# Patient Record
Sex: FEMALE | Race: WHITE | HISPANIC OR LATINO | Employment: UNEMPLOYED | URBAN - METROPOLITAN AREA
[De-identification: names, ages, dates, MRNs, and addresses within clinical notes are randomized per-mention and may not be internally consistent; named-entity substitution may affect disease eponyms.]

---

## 2022-07-13 ENCOUNTER — TRANSFERRED RECORDS (OUTPATIENT)
Dept: MULTI SPECIALTY CLINIC | Facility: CLINIC | Age: 32
End: 2022-07-13

## 2022-07-13 LAB
HPV ABSTRACT: NORMAL
PAP-ABSTRACT: NORMAL

## 2022-10-11 ENCOUNTER — OFFICE VISIT (OUTPATIENT)
Dept: FAMILY MEDICINE | Facility: CLINIC | Age: 32
End: 2022-10-11
Payer: COMMERCIAL

## 2022-10-11 VITALS
HEIGHT: 62 IN | OXYGEN SATURATION: 99 % | BODY MASS INDEX: 32.13 KG/M2 | HEART RATE: 91 BPM | TEMPERATURE: 98.5 F | RESPIRATION RATE: 18 BRPM | DIASTOLIC BLOOD PRESSURE: 77 MMHG | WEIGHT: 174.6 LBS | SYSTOLIC BLOOD PRESSURE: 117 MMHG

## 2022-10-11 DIAGNOSIS — E66.811 CLASS 1 OBESITY DUE TO EXCESS CALORIES WITHOUT SERIOUS COMORBIDITY WITH BODY MASS INDEX (BMI) OF 31.0 TO 31.9 IN ADULT: ICD-10-CM

## 2022-10-11 DIAGNOSIS — Z78.9 NON-SMOKER: ICD-10-CM

## 2022-10-11 DIAGNOSIS — E66.09 CLASS 1 OBESITY DUE TO EXCESS CALORIES WITHOUT SERIOUS COMORBIDITY WITH BODY MASS INDEX (BMI) OF 31.0 TO 31.9 IN ADULT: ICD-10-CM

## 2022-10-11 DIAGNOSIS — E03.8 OTHER SPECIFIED HYPOTHYROIDISM: ICD-10-CM

## 2022-10-11 DIAGNOSIS — Z00.00 ROUTINE GENERAL MEDICAL EXAMINATION AT A HEALTH CARE FACILITY: Primary | ICD-10-CM

## 2022-10-11 PROCEDURE — 99213 OFFICE O/P EST LOW 20 MIN: CPT | Mod: 25 | Performed by: STUDENT IN AN ORGANIZED HEALTH CARE EDUCATION/TRAINING PROGRAM

## 2022-10-11 PROCEDURE — 99385 PREV VISIT NEW AGE 18-39: CPT | Performed by: STUDENT IN AN ORGANIZED HEALTH CARE EDUCATION/TRAINING PROGRAM

## 2022-10-11 PROCEDURE — 84443 ASSAY THYROID STIM HORMONE: CPT | Performed by: STUDENT IN AN ORGANIZED HEALTH CARE EDUCATION/TRAINING PROGRAM

## 2022-10-11 PROCEDURE — 36415 COLL VENOUS BLD VENIPUNCTURE: CPT | Performed by: STUDENT IN AN ORGANIZED HEALTH CARE EDUCATION/TRAINING PROGRAM

## 2022-10-11 RX ORDER — LEVOTHYROXINE SODIUM 25 UG/1
25 TABLET ORAL DAILY
COMMUNITY
End: 2023-04-28

## 2022-10-11 ASSESSMENT — ENCOUNTER SYMPTOMS
SORE THROAT: 1
CHILLS: 0
DIARRHEA: 0
EYE PAIN: 1
NAUSEA: 1
FREQUENCY: 0
HEADACHES: 1
SHORTNESS OF BREATH: 1
ARTHRALGIAS: 1
HEMATURIA: 0
MYALGIAS: 1
HEARTBURN: 1
PALPITATIONS: 1
ABDOMINAL PAIN: 1
DYSURIA: 0
COUGH: 0
CONSTIPATION: 0
DIZZINESS: 1
NERVOUS/ANXIOUS: 1
JOINT SWELLING: 0
FEVER: 0
PARESTHESIAS: 1
BREAST MASS: 1
WEAKNESS: 1
HEMATOCHEZIA: 0

## 2022-10-11 ASSESSMENT — PAIN SCALES - GENERAL: PAINLEVEL: MODERATE PAIN (5)

## 2022-10-11 NOTE — PROGRESS NOTES
SUBJECTIVE:   CC: Judy is an 32 year old who presents for preventive health visit.       Patient has been advised of split billing requirements and indicates understanding: Yes  Healthy Habits:     Getting at least 3 servings of Calcium per day:  Yes    Bi-annual eye exam:  NO    Dental care twice a year:  NO    Sleep apnea or symptoms of sleep apnea:  None    Diet:  Regular (no restrictions)    Frequency of exercise:  2-3 days/week    Duration of exercise:  N/A    Taking medications regularly:  Yes    Medication side effects:  None    PHQ-2 Total Score: 0    Additional concerns today:  Yes          Patient has been taking levothyroxine since when she was in [place].    Today's PHQ-2 Score:   PHQ-2 ( 1999 Pfizer) 10/11/2022   Q1: Little interest or pleasure in doing things 0   Q2: Feeling down, depressed or hopeless 0   PHQ-2 Score 0   Q1: Little interest or pleasure in doing things Not at all   Q2: Feeling down, depressed or hopeless Not at all   PHQ-2 Score 0       Abuse: Current or Past (Physical, Sexual or Emotional) - No  Do you feel safe in your environment? Yes        Social History     Tobacco Use     Smoking status: Never     Smokeless tobacco: Never   Substance Use Topics     Alcohol use: Yes     Comment: Occassionally     If you drink alcohol do you typically have >3 drinks per day or >7 drinks per week? No    Alcohol Use 10/11/2022   Prescreen: >3 drinks/day or >7 drinks/week? No   Prescreen: >3 drinks/day or >7 drinks/week? -       Reviewed orders with patient.  Reviewed health maintenance and updated orders accordingly - Yes  Lab work is in process    Breast Cancer Screening:    Breast CA Risk Assessment (FHS-7) 10/11/2022   Do you have a family history of breast, colon, or ovarian cancer? No / Unknown         History of abnormal Pap smear: NO - age 30-65 PAP every 5 years with negative HPV co-testing recommended.  She had Pap smear done in July of this year that was unremarkable     Reviewed and  "updated as needed this visit by clinical staff   Tobacco  Allergies  Meds  Problems  Med Hx  Surg Hx  Fam Hx          Reviewed and updated as needed this visit by Provider   Tobacco  Allergies  Meds  Problems  Med Hx  Surg Hx  Fam Hx         History reviewed. No pertinent past medical history.   History reviewed. No pertinent surgical history.  OB History   No obstetric history on file.         Review of Systems   Constitutional: Negative for chills and fever.   HENT: negative for ear pain, hearing loss and sore throat. Negative for congestion.    Eyes: negative for pain and visual disturbance.   Respiratory: negative for shortness of breath. Negative for cough.    Cardiovascular: negative for chest pain and palpitations. Negative for peripheral edema.   Gastrointestinal: negative for abdominal pain, heartburn and nausea. Negative for constipation, diarrhea and hematochezia.   Breasts:  negative for breast mass. Negative for tenderness and discharge.   Genitourinary: Negative for dysuria, frequency, genital sores, hematuria, pelvic pain, urgency, vaginal bleeding and vaginal discharge.   Musculoskeletal: negative for arthralgias and myalgias. Negative for joint swelling.   Skin: Negative for rash.   Neurological: negative for dizziness, weakness, headaches and paresthesias.   Psychiatric/Behavioral: negative for mood changes. The patient is nervous/anxious.         OBJECTIVE:   /77   Pulse 91   Temp 98.5  F (36.9  C) (Oral)   Resp 18   Ht 1.575 m (5' 2\")   Wt 79.2 kg (174 lb 9.6 oz)   LMP 09/15/2022 (Approximate)   SpO2 99%   BMI 31.93 kg/m    Physical Exam  GENERAL: healthy, alert and no distress  EYES: Eyes grossly normal to inspection, PERRL and conjunctivae and sclerae normal  HENT: ear canals and TM's normal, nose and mouth without ulcers or lesions  NECK: no adenopathy, no asymmetry, masses, or scars and thyroid normal to palpation  RESP: lungs clear to auscultation - no rales, " "rhonchi or wheezes  CV: regular rate and rhythm, normal S1 S2, no S3 or S4, no murmur, click or rub, no peripheral edema and peripheral pulses strong  ABDOMEN: soft, nontender, no hepatosplenomegaly, no masses and bowel sounds normal  MS: no gross musculoskeletal defects noted, no edema  SKIN: no suspicious lesions or rashes  NEURO: Normal strength and tone, mentation intact and speech normal  PSYCH: mentation appears normal, affect normal/bright      ASSESSMENT/PLAN:   1. Routine general medical examination at a health care facility  Everywhere results reviewed patient was negative for hepatitis B, hepatitis C and HIV in March 2022  - Adult Eye  Referral; Future  - Dental Referral; Future    2. Other specified hypothyroidism  Continue levothyroxine.  She is on levothyroxine 25 mg daily from Monday to Friday  - TSH with free T4 reflex; Future  - TSH with free T4 reflex    3. Non-smoker      4. Class 1 obesity due to excess calories without serious comorbidity with body mass index (BMI) of 31.0 to 31.9 in adult    Counseled on diet and exercise    Patient has been advised of split billing requirements and indicates understanding: Yes    COUNSELING:  Reviewed preventive health counseling, as reflected in patient instructions    Estimated body mass index is 31.93 kg/m  as calculated from the following:    Height as of this encounter: 1.575 m (5' 2\").    Weight as of this encounter: 79.2 kg (174 lb 9.6 oz).    Weight management plan: Discussed healthy diet and exercise guidelines    She reports that she has never smoked. She has never used smokeless tobacco.      Counseling Resources:  ATP IV Guidelines  Pooled Cohorts Equation Calculator  Breast Cancer Risk Calculator  BRCA-Related Cancer Risk Assessment: FHS-7 Tool  FRAX Risk Assessment  ICSI Preventive Guidelines  Dietary Guidelines for Americans, 2010  [employer]'s MyPlate  ASA Prophylaxis  Lung CA Screening    Rachel Davis MD  [facility] " [employer] [place]

## 2022-10-11 NOTE — LETTER
October 13, 2022      Judy Kelly  [address]  [place] [address]        Dear MsMayito,    We are writing to inform you of your test results.    Your result are normal.    Resulted Orders   TSH with free T4 reflex   Result Value Ref Range    TSH 1.90 0.40 - 4.00 mU/L       If you have any questions or concerns, please call the clinic at the number listed above.       Sincerely,    Rachel Davis MD,MPH    hlo

## 2022-10-12 LAB — TSH SERPL DL<=0.005 MIU/L-ACNC: 1.9 MU/L (ref 0.4–4)

## 2022-11-22 ENCOUNTER — MEDICAL CORRESPONDENCE (OUTPATIENT)
Dept: HEALTH INFORMATION MANAGEMENT | Facility: CLINIC | Age: 32
End: 2022-11-22

## 2022-11-23 ENCOUNTER — OFFICE VISIT (OUTPATIENT)
Dept: OPHTHALMOLOGY | Facility: CLINIC | Age: 32
End: 2022-11-23
Payer: COMMERCIAL

## 2022-11-23 DIAGNOSIS — Z01.00 EXAMINATION OF EYES AND VISION: Primary | ICD-10-CM

## 2022-11-23 DIAGNOSIS — H52.13 MYOPIA OF BOTH EYES: ICD-10-CM

## 2022-11-23 PROBLEM — O92.70 LACTATION DISORDER: Status: ACTIVE | Noted: 2022-06-03

## 2022-11-23 PROCEDURE — 92004 COMPRE OPH EXAM NEW PT 1/>: CPT | Performed by: OPHTHALMOLOGY

## 2022-11-23 PROCEDURE — 92015 DETERMINE REFRACTIVE STATE: CPT | Performed by: OPHTHALMOLOGY

## 2022-11-23 ASSESSMENT — REFRACTION_MANIFEST
OS_CYLINDER: +0.25
OS_SPHERE: -0.50
OD_SPHERE: -0.50
OS_AXIS: 010
OD_AXIS: 170
OD_CYLINDER: +0.50

## 2022-11-23 ASSESSMENT — VISUAL ACUITY
OS_SC: 20/20
OD_SC+: -2
OS_SC+: -1
METHOD: SNELLEN - LINEAR
OD_SC: 20/20

## 2022-11-23 ASSESSMENT — EXTERNAL EXAM - RIGHT EYE: OD_EXAM: NORMAL

## 2022-11-23 ASSESSMENT — CONF VISUAL FIELD
OD_SUPERIOR_NASAL_RESTRICTION: 0
OS_INFERIOR_TEMPORAL_RESTRICTION: 0
OS_INFERIOR_NASAL_RESTRICTION: 0
OS_NORMAL: 1
OD_INFERIOR_TEMPORAL_RESTRICTION: 0
OS_SUPERIOR_NASAL_RESTRICTION: 0
OS_SUPERIOR_TEMPORAL_RESTRICTION: 0
OD_NORMAL: 1
OD_INFERIOR_NASAL_RESTRICTION: 0
OD_SUPERIOR_TEMPORAL_RESTRICTION: 0

## 2022-11-23 ASSESSMENT — CUP TO DISC RATIO
OS_RATIO: 0.5
OD_RATIO: 0.5

## 2022-11-23 ASSESSMENT — TONOMETRY
IOP_METHOD: APPLANATION
OD_IOP_MMHG: 16
OS_IOP_MMHG: 16

## 2022-11-23 ASSESSMENT — SLIT LAMP EXAM - LIDS
COMMENTS: NORMAL
COMMENTS: NORMAL

## 2022-11-23 ASSESSMENT — EXTERNAL EXAM - LEFT EYE: OS_EXAM: NORMAL

## 2022-11-23 NOTE — PATIENT INSTRUCTIONS
"Glasses Rx given - optional (do not need)  May use artificial tears up to four times a day (like Refresh Optive, Systane Balance, TheraTears, or generic artificial tears are ok. Avoid \"get the red out\" drops).   If concerned about eye redness try using Lumify.  Call in July 2023/ 2024 for an appointment in November 2023/ 2024 for Complete Exam    Dr. Scott [phone]        Gafas Rx dadas - opcional (no es necesario)  Puede usar lágrimas artificiales hasta cuatro veces al día (alec Refresh Optive, Systane Balance, TheraTears, o lágrimas artificiales genéricas están walt. Evite las gotas de \"sacar el disla\").   Si le preocupa el enrojecimiento de los ojos, intente usar Lumify.  Llame en julio de 2023 / 2024 para medhat charles en noviembre de 2023 / 2024 para el examen completo    Dr. Scott [phone]  "

## 2022-11-23 NOTE — LETTER
"    11/23/2022         RE: Judy Kelly  [address]  [place] [address]        Dear Colleague,    Thank you for referring your patient, Judy Kelly, to the [facility]. Please see a copy of my visit note below.     Current Eye Medications:  Visine red out BID Both eyes. Was told to use these drops about three years ago.     Subjective:  Here for complete eye exam. Sometimes blurred in the distance. Does not have any glasses, wasn't given an Rx when she had her last eye exam.  She had eye exam about three years ago.     Objective:  See Ophthalmology Exam.       Assessment:  Baseline eye exam in patient with mild myopia.      ICD-10-CM    1. Examination of eyes and vision  Z01.00       2. Myopia of both eyes  H52.13            Plan:  Glasses Rx given - optional (do not need)  May use artificial tears up to four times a day (like Refresh Optive, Systane Balance, TheraTears, or generic artificial tears are ok. Avoid \"get the red out\" drops).   If concerned about eye redness try using Lumify.  Call in July 2023/ 2024 for an appointment in November 2023/ 2024 for Complete Exam    Dr. Scott [phone]           Again, thank you for allowing me to participate in the care of your patient.        Sincerely,        Marshall Scott MD    "

## 2022-11-23 NOTE — PROGRESS NOTES
" Current Eye Medications:  Visine red out BID Both eyes. Was told to use these drops about three years ago.     Subjective:  Here for complete eye exam. Sometimes blurred in the distance. Does not have any glasses, wasn't given an Rx when she had her last eye exam.  She had eye exam about three years ago.     Objective:  See Ophthalmology Exam.       Assessment:  Baseline eye exam in patient with mild myopia.      ICD-10-CM    1. Examination of eyes and vision  Z01.00       2. Myopia of both eyes  H52.13            Plan:  Glasses Rx given - optional (do not need)  May use artificial tears up to four times a day (like Refresh Optive, Systane Balance, TheraTears, or generic artificial tears are ok. Avoid \"get the red out\" drops).   If concerned about eye redness try using Lumify.  Call in July 2023/ 2024 for an appointment in November 2023/ 2024 for Complete Exam    Dr. Scott [phone]       "

## 2022-12-06 ENCOUNTER — TELEPHONE (OUTPATIENT)
Dept: OPHTHALMOLOGY | Facility: CLINIC | Age: 32
End: 2022-12-06

## 2022-12-06 DIAGNOSIS — H57.89 REDNESS OR DISCHARGE OF EYE: Primary | ICD-10-CM

## 2022-12-06 NOTE — TELEPHONE ENCOUNTER
[facility] Call Center    Phone Message    May a detailed message be left on voicemail: yes     Reason for Call: Other: Pt called about her her order for Tetrahydrozoline HCl (VISINE RED EYE COMFORT OP). She would like to know what pharmacy it was sent to. Writer is not finding that information on the Rx list. Please contact pt to discuss. She would like the order to be sent to [facility] [place], [place] - [address] [facility].     Thank you     Action Taken: Message routed to:  [facility] eye    Travel Screening: Not Applicable

## 2023-03-23 ENCOUNTER — OFFICE VISIT (OUTPATIENT)
Dept: FAMILY MEDICINE | Facility: CLINIC | Age: 33
End: 2023-03-23
Payer: COMMERCIAL

## 2023-03-23 VITALS
TEMPERATURE: 97.2 F | HEART RATE: 79 BPM | WEIGHT: 169 LBS | HEIGHT: 62 IN | OXYGEN SATURATION: 100 % | RESPIRATION RATE: 14 BRPM | DIASTOLIC BLOOD PRESSURE: 78 MMHG | BODY MASS INDEX: 31.1 KG/M2 | SYSTOLIC BLOOD PRESSURE: 110 MMHG

## 2023-03-23 DIAGNOSIS — Z30.09 ENCOUNTER FOR OTHER GENERAL COUNSELING OR ADVICE ON CONTRACEPTION: ICD-10-CM

## 2023-03-23 DIAGNOSIS — L80 VITILIGO: ICD-10-CM

## 2023-03-23 DIAGNOSIS — Z71.89 ADVANCED CARE PLANNING/COUNSELING DISCUSSION: ICD-10-CM

## 2023-03-23 DIAGNOSIS — E03.8 OTHER SPECIFIED HYPOTHYROIDISM: Primary | ICD-10-CM

## 2023-03-23 DIAGNOSIS — R20.2 PARESTHESIAS: ICD-10-CM

## 2023-03-23 DIAGNOSIS — R63.1 POLYDIPSIA: ICD-10-CM

## 2023-03-23 LAB
HBA1C MFR BLD: 5.5 % (ref 0–5.6)
TSH SERPL DL<=0.005 MIU/L-ACNC: 2.24 MU/L (ref 0.4–4)

## 2023-03-23 PROCEDURE — 83036 HEMOGLOBIN GLYCOSYLATED A1C: CPT | Performed by: PHYSICIAN ASSISTANT

## 2023-03-23 PROCEDURE — 84443 ASSAY THYROID STIM HORMONE: CPT | Performed by: PHYSICIAN ASSISTANT

## 2023-03-23 PROCEDURE — 36415 COLL VENOUS BLD VENIPUNCTURE: CPT | Performed by: PHYSICIAN ASSISTANT

## 2023-03-23 PROCEDURE — 82607 VITAMIN B-12: CPT | Performed by: PHYSICIAN ASSISTANT

## 2023-03-23 PROCEDURE — 99214 OFFICE O/P EST MOD 30 MIN: CPT | Performed by: PHYSICIAN ASSISTANT

## 2023-03-23 RX ORDER — TRIAMCINOLONE ACETONIDE 1 MG/G
CREAM TOPICAL 2 TIMES DAILY
Qty: 80 G | Refills: 1 | Status: SHIPPED | OUTPATIENT
Start: 2023-03-23 | End: 2023-10-20

## 2023-03-23 ASSESSMENT — PAIN SCALES - GENERAL: PAINLEVEL: NO PAIN (0)

## 2023-03-23 NOTE — PROGRESS NOTES
Assessment & Plan   Problem List Items Addressed This Visit    None  Visit Diagnoses     Other specified hypothyroidism    -  Primary    Relevant Orders    TSH with free T4 reflex (Completed)    Advanced care planning/counseling discussion        Paresthesias        Relevant Orders    Hemoglobin A1c (Completed)    Vitamin B12    Polydipsia        Relevant Orders    Hemoglobin A1c (Completed)    Vitiligo        Relevant Medications    triamcinolone (KENALOG) 0.1 % external cream    Other Relevant Orders    Adult Dermatology Referral    Encounter for other general counseling or advice on contraception        Relevant Orders    Ob/Gyn Referral         She has multiple concerns today.  Interested in repeat TSH testing, though her last level was normal.  She has been taking thyroid replacement regularly.  She would like treatment for vitiligo.  We will trial the above cream and have her follow-up with dermatology.  No overlying signs of infection.  A1c reassuring, this lower extremity paresthesias are likely not neuropathic due to DM.  B12 pending.  She is also interested in seeing OB/GYN to discuss sterilization.   is not interested in vasectomy and she is not interested in an IUD.  Referral placed.    Complete history and physical exam as below. Afebrile with normal vital signs.    DDx and Dx discussed with and explained to the pt to their satisfaction.  All questions were answered at this time. Pt expressed understanding of and agreement with this dx, tx, and plan. No further workup warranted and standard medication warnings given. I have given the patient a list of pertinent indications for re-evaluation. Will go to the Emergency Department if symptoms worsen or new concerning symptoms arise. Patient left in no apparent distress.     Ordering of each unique test     See Patient Instructions    DANYEL Brownlee  [facility] [place] Kim is a 32 year old, presenting for the  "following health issues:  Thyroid Problem, Diabetes, and Rash  No flowsheet data found.  History of Present Illness       Diabetes:   She presents for follow up of diabetes.  She is not checking blood glucose. She is concerned about other. She is having numbness in feet, burning in feet, blurry vision and weight gain.         Hypothyroidism:     Since last visit, patient describes the following symptoms::  Fatigue    She eats 4 or more servings of fruits and vegetables daily.She consumes 0 sweetened beverage(s) daily.She exercises with enough effort to increase her heart rate 30 to 60 minutes per day.  She exercises with enough effort to increase her heart rate 4 days per week.   She is taking medications regularly.     Wants blood work to check how her thyroid is doing.  TSH was last normal 5 months ago and she had no adjustment in her levothyroxine dose at that time.  Numbness tingling in feet for 2+ years. Prediabetes diagnosis in [place] 2 years ago.   Light spots on her forehead, both sides of breasts. 6 months now.  Was treated for vitiligo previously with topical creams.    Review of Systems   Constitutional, HEENT, cardiovascular, pulmonary, gi and gu systems are negative, except as otherwise noted.      Objective    /78   Pulse 79   Temp 97.2  F (36.2  C) (Tympanic)   Resp 14   Ht 1.575 m (5' 2.01\")   Wt 76.7 kg (169 lb)   LMP 03/22/2023 (Approximate)   SpO2 100%   Breastfeeding No   BMI 30.90 kg/m    Body mass index is 30.9 kg/m .  Physical Exam  Vitals and nursing note reviewed. Exam conducted with a chaperone present ( could hear audio during exam).   Constitutional:       General: She is not in acute distress.     Appearance: Normal appearance. She is not diaphoretic.   HENT:      Head: Normocephalic and atraumatic.      Nose: Nose normal.   Eyes:      Conjunctiva/sclera: Conjunctivae normal.   Cardiovascular:      Comments: 2+ symmetric DP pulses.  Pulmonary:      " Effort: Pulmonary effort is normal. No respiratory distress.   Skin:     General: Skin is dry.      Coloration: Skin is not jaundiced or pale.      Comments: 3 patches of hypopigmentation are present.  1 was present on her right forehead along the hairline and the other 2 are present on the left breast.  No overlying signs of trauma or infection.  No discharge from the nipple or dimpling of the breast.   Neurological:      General: No focal deficit present.      Mental Status: She is alert. Mental status is at baseline.      Comments: Diminished sensation to light touch along the soles of her feet bilaterally.  No overlying signs of trauma or infection.   Psychiatric:         Mood and Affect: Mood normal.         Behavior: Behavior normal.          Results for orders placed or performed in visit on 03/23/23   TSH with free T4 reflex     Status: Normal   Result Value Ref Range    TSH 2.24 0.40 - 4.00 mU/L   Hemoglobin A1c     Status: Normal   Result Value Ref Range    Hemoglobin A1C 5.5 0.0 - 5.6 %

## 2023-03-23 NOTE — LETTER
March 26, 2023      Judy Kelly  [address]  [place] [address]        Dear ,    We are writing to inform you of your test results.    Your test results fall within the expected range(s) or remain unchanged from previous results.  Please continue with current treatment plan.    Resulted Orders   TSH with free T4 reflex   Result Value Ref Range    TSH 2.24 0.40 - 4.00 mU/L   Hemoglobin A1c   Result Value Ref Range    Hemoglobin A1C 5.5 0.0 - 5.6 %      Comment:      Normal <5.7%   Prediabetes 5.7-6.4%    Diabetes 6.5% or higher     Note: Adopted from ADA consensus guidelines.   Vitamin B12   Result Value Ref Range    Vitamin B12 971 232 - 1,245 pg/mL       If you have any questions or concerns, please call the clinic at the number listed above.       Sincerely,      DANYEL Brownlee

## 2023-03-23 NOTE — PATIENT INSTRUCTIONS
Sunday Carrillo  Si necesita un intérprete de español, por favor conchis al [phone].    If you need a , please contact us at [phone].    Thank you for allowing [facility] to manage your care.    If you develop worsening/changing symptoms at any time, please call 911 or go to the emergency department for evaluation.    I ordered some lab work, please go to the laboratory to get your studies.    I sent your prescriptions to your pharmacy.    I made referral to dermatology and they will be calling in approximately 1 week to set up your appointment.  If you do not hear from them, please call the specialty number on your after visit summary.     Call the [facility] to schedule an appointment [facility] to discuss a tubal ligation to prevent further pregnancies.    Please allow 1-2 business days for our office to contact you in regards to your laboratory/radiological studies.  If not done so, I encourage you to login into [email or web address] to review your results as well.     If you have any questions or concerns, please feel free to call us at [phone]    Sincerely,    Burke Moss PA-C    Did you know?      You can schedule a video visit for follow-up appointments as well as future appointments for certain conditions.  Please see the below link.     [email or web address]    If you have not already done so,  I encourage you to sign up for [email or web address] ([email or web address]).  This will allow you to review your results, securely communicate with a provider, and schedule virtual visits as well.

## 2023-03-24 LAB — VIT B12 SERPL-MCNC: 971 PG/ML (ref 232–1245)

## 2023-04-28 ENCOUNTER — OFFICE VISIT (OUTPATIENT)
Dept: OBGYN | Facility: CLINIC | Age: 33
End: 2023-04-28
Payer: COMMERCIAL

## 2023-04-28 VITALS
WEIGHT: 171.4 LBS | DIASTOLIC BLOOD PRESSURE: 75 MMHG | BODY MASS INDEX: 31.34 KG/M2 | HEART RATE: 91 BPM | OXYGEN SATURATION: 97 % | SYSTOLIC BLOOD PRESSURE: 104 MMHG

## 2023-04-28 DIAGNOSIS — Z30.09 STERILIZATION CONSULT: Primary | ICD-10-CM

## 2023-04-28 DIAGNOSIS — E03.8 OTHER SPECIFIED HYPOTHYROIDISM: Primary | ICD-10-CM

## 2023-04-28 PROCEDURE — 99204 OFFICE O/P NEW MOD 45 MIN: CPT | Performed by: OBSTETRICS & GYNECOLOGY

## 2023-04-28 RX ORDER — LEVOTHYROXINE SODIUM 25 UG/1
25 TABLET ORAL DAILY
Qty: 90 TABLET | Refills: 3 | Status: SHIPPED | OUTPATIENT
Start: 2023-04-28 | End: 2024-02-16

## 2023-04-28 NOTE — PROGRESS NOTES
Judy is a 32 year old  referred here by Self for consultation regarding sterilization..  Patient is here with her daughter who is 20 years old and   by iPad.  She is here to inquire about permanent sterilization.  She presently has the ParaGard IUD placed about a year ago after her last delivery by .  She says she does not have any complaints about the ParaGard IUD verified from care everywhere.  She says she is not sure about how long the ParaGard IUD last.  I looked that up for her and verify that is the ParaGard IUD placed on 7/15/2022 which would last for 10 years.      ROS: Ten point review of systems was reviewed and negative except the above.  OB History    Para Term  AB Living   3 3 2 1 0 3   SAB IAB Ectopic Multiple Live Births   0 0 0 0 3      # Outcome Date GA Lbr Joseph/2nd Weight Sex Delivery Anes PTL Lv   3  22    M CS-Unspec   PETE   2 Term 14    M    PETE   1 Term 10/11/07    F    PETE       Gyne: - abn pap (last pap ), - STD's    History reviewed. No pertinent past medical history.  History reviewed. No pertinent surgical history.  Patient Active Problem List   Diagnosis     Lactation disorder       ALL/Meds: Her medication and allergy histories were reviewed and are documented in their appropriate chart areas.    SH: - tob, - EtOH,     FH: Her family history was reviewed and documented in its appropriate chart area.    PE: /75 (BP Location: Left arm, Patient Position: Sitting, Cuff Size: Adult Large)   Pulse 91   Wt 77.7 kg (171 lb 6.4 oz)   LMP 2023 (Approximate)   SpO2 97%   BMI 31.34 kg/m    Body mass index is 31.34 kg/m .    General Appearance:  healthy, alert, active, no distress  HEENT: NCAT    A/P    ICD-10-CM    1. Sterilization consult  Z30.09         Reviewed different methods of sterilization including vasectomy, Essure and LSC TL. Reviewed pros/cons of different methods of sterilization. Reviewed  that compared to female sterilization, male sterilization is an in office procedure and less surgically risky due to the external placement of the male reproductive organs. Reviewed that LSC TL requires general anesthesia, 2 small incisions, and is effective immediately without need for confirmatory testing.    With the aid of the ACOG   pamphlet on sterilization for both men and females, we discussed the risk benefits and alternatives to sterilization specifically vasectomy for his spouse.  I stressed that vasectomy was safer than female sterilization.  She has agreed to discuss these options with her  and come back as needed..  I also stressed that the ParaGard IUD and the sterilization of both 99% effective, therefore there is no need for any extra contraception.  However if her bleeding becomes a problem she can consider the Mirena IUD which is good for 8 years.    Total time preparing to see patient with reviewing prior encounter and labs, face to face time,  and coordinating care on the same calendar date: 45 mins        CEPHAS AGBEH, MD.

## 2023-04-28 NOTE — PATIENT INSTRUCTIONS
To Schedule an Appointment 24/7  Call: [phone]    If you have any questions regarding your visit, Please contact your care team.  [facility]: [phone]  [facility] HOURS TELEPHONE NUMBER   Cephas Agbeh, M.D.      Keith Rodriguez-Surgery Scheduler  [facility]-Surgery Scheduler         Monday - [place]:    8:00 am-4:45 pm  Tuesday - [place]:   8:00 am-4:45 pm  Friday-[place]:       8:00 am-4:45 pm  Typical Surgery Day:  Wednesday [facility]   [address]   [place] [address] [phone]   Fax [phone]    Imaging Scheduling all locations  [phone]      [facility] Labor and Delivery   [address] [place] [address] [address].   [place], [place] [address] [phone]    [facility]  [address] [facility] [address]  [phone]  Fax [phone]     Urgent Care locations:  [facility] Monday-Friday                               10 am - 8 pm  Saturday and Sunday                      9 am - 5 pm  Monday-Friday                              10 am- 8 pm  Saturday and Sunday                      9 am - 5 pm    [phone]   **Surgeries** Our Surgery Schedulers will contact you to schedule. If you do not receive a call within 3 business days, please call [phone].  If you need a medication refill, please contact your pharmacy. Please allow 3 business days for your refill to be completed.  As always, Thank you for trusting us with your healthcare needs!  see additional instructions from your care team below

## 2023-09-11 ENCOUNTER — PATIENT OUTREACH (OUTPATIENT)
Dept: CARE COORDINATION | Facility: CLINIC | Age: 33
End: 2023-09-11
Payer: COMMERCIAL

## 2023-09-25 ENCOUNTER — PATIENT OUTREACH (OUTPATIENT)
Dept: CARE COORDINATION | Facility: CLINIC | Age: 33
End: 2023-09-25
Payer: COMMERCIAL

## 2023-10-20 ENCOUNTER — OFFICE VISIT (OUTPATIENT)
Dept: FAMILY MEDICINE | Facility: CLINIC | Age: 33
End: 2023-10-20
Payer: COMMERCIAL

## 2023-10-20 VITALS
WEIGHT: 169.6 LBS | BODY MASS INDEX: 31.21 KG/M2 | HEART RATE: 86 BPM | HEIGHT: 62 IN | SYSTOLIC BLOOD PRESSURE: 112 MMHG | RESPIRATION RATE: 20 BRPM | OXYGEN SATURATION: 100 % | TEMPERATURE: 97.2 F | DIASTOLIC BLOOD PRESSURE: 80 MMHG

## 2023-10-20 DIAGNOSIS — Z78.9 NON-ENGLISH SPEAKING PATIENT: ICD-10-CM

## 2023-10-20 DIAGNOSIS — R45.86 MOOD CHANGE: ICD-10-CM

## 2023-10-20 DIAGNOSIS — Z13.1 SCREENING FOR DIABETES MELLITUS: ICD-10-CM

## 2023-10-20 DIAGNOSIS — Z13.220 SCREENING CHOLESTEROL LEVEL: ICD-10-CM

## 2023-10-20 DIAGNOSIS — H53.8 BLURRED VISION: ICD-10-CM

## 2023-10-20 DIAGNOSIS — Z12.31 ENCOUNTER FOR SCREENING MAMMOGRAM FOR BREAST CANCER: ICD-10-CM

## 2023-10-20 DIAGNOSIS — R07.81 RIB PAIN ON RIGHT SIDE: ICD-10-CM

## 2023-10-20 DIAGNOSIS — R10.32 LLQ ABDOMINAL PAIN: ICD-10-CM

## 2023-10-20 DIAGNOSIS — S02.5XXK OPEN FRACTURE OF TOOTH WITH NONUNION, SUBSEQUENT ENCOUNTER: ICD-10-CM

## 2023-10-20 DIAGNOSIS — R10.11 RUQ ABDOMINAL PAIN: ICD-10-CM

## 2023-10-20 DIAGNOSIS — K59.00 CONSTIPATION, UNSPECIFIED CONSTIPATION TYPE: ICD-10-CM

## 2023-10-20 DIAGNOSIS — N92.6 IRREGULAR MENSES: ICD-10-CM

## 2023-10-20 DIAGNOSIS — E03.9 ACQUIRED HYPOTHYROIDISM: ICD-10-CM

## 2023-10-20 DIAGNOSIS — Z00.00 ROUTINE GENERAL MEDICAL EXAMINATION AT A HEALTH CARE FACILITY: ICD-10-CM

## 2023-10-20 DIAGNOSIS — R53.83 FATIGUE, UNSPECIFIED TYPE: Primary | ICD-10-CM

## 2023-10-20 LAB
ALBUMIN SERPL BCG-MCNC: 4.3 G/DL (ref 3.5–5.2)
ALP SERPL-CCNC: 93 U/L (ref 35–104)
ALT SERPL W P-5'-P-CCNC: 14 U/L (ref 0–50)
ANION GAP SERPL CALCULATED.3IONS-SCNC: 11 MMOL/L (ref 7–15)
AST SERPL W P-5'-P-CCNC: 25 U/L (ref 0–45)
BILIRUB SERPL-MCNC: 0.3 MG/DL
BUN SERPL-MCNC: 7.6 MG/DL (ref 6–20)
CALCIUM SERPL-MCNC: 9.1 MG/DL (ref 8.6–10)
CHLORIDE SERPL-SCNC: 104 MMOL/L (ref 98–107)
CHOLEST SERPL-MCNC: 177 MG/DL
CREAT SERPL-MCNC: 0.61 MG/DL (ref 0.51–0.95)
DEPRECATED HCO3 PLAS-SCNC: 24 MMOL/L (ref 22–29)
EGFRCR SERPLBLD CKD-EPI 2021: >90 ML/MIN/1.73M2
GLUCOSE SERPL-MCNC: 87 MG/DL (ref 70–99)
HBA1C MFR BLD: 5.7 % (ref 0–5.6)
HDLC SERPL-MCNC: 55 MG/DL
LDLC SERPL CALC-MCNC: 108 MG/DL
NONHDLC SERPL-MCNC: 122 MG/DL
POTASSIUM SERPL-SCNC: 3.8 MMOL/L (ref 3.4–5.3)
PROT SERPL-MCNC: 7.9 G/DL (ref 6.4–8.3)
SODIUM SERPL-SCNC: 139 MMOL/L (ref 135–145)
TRIGL SERPL-MCNC: 68 MG/DL
TSH SERPL DL<=0.005 MIU/L-ACNC: 1.84 UIU/ML (ref 0.3–4.2)

## 2023-10-20 PROCEDURE — 36415 COLL VENOUS BLD VENIPUNCTURE: CPT | Performed by: NURSE PRACTITIONER

## 2023-10-20 PROCEDURE — 91320 SARSCV2 VAC 30MCG TRS-SUC IM: CPT | Performed by: NURSE PRACTITIONER

## 2023-10-20 PROCEDURE — 99395 PREV VISIT EST AGE 18-39: CPT | Mod: 25 | Performed by: NURSE PRACTITIONER

## 2023-10-20 PROCEDURE — 90480 ADMN SARSCOV2 VAC 1/ONLY CMP: CPT | Performed by: NURSE PRACTITIONER

## 2023-10-20 PROCEDURE — 80053 COMPREHEN METABOLIC PANEL: CPT | Performed by: NURSE PRACTITIONER

## 2023-10-20 PROCEDURE — 90471 IMMUNIZATION ADMIN: CPT | Performed by: NURSE PRACTITIONER

## 2023-10-20 PROCEDURE — 90746 HEPB VACCINE 3 DOSE ADULT IM: CPT | Performed by: NURSE PRACTITIONER

## 2023-10-20 PROCEDURE — 90715 TDAP VACCINE 7 YRS/> IM: CPT | Performed by: NURSE PRACTITIONER

## 2023-10-20 PROCEDURE — 80061 LIPID PANEL: CPT | Performed by: NURSE PRACTITIONER

## 2023-10-20 PROCEDURE — 90686 IIV4 VACC NO PRSV 0.5 ML IM: CPT | Performed by: NURSE PRACTITIONER

## 2023-10-20 PROCEDURE — 84443 ASSAY THYROID STIM HORMONE: CPT | Performed by: NURSE PRACTITIONER

## 2023-10-20 PROCEDURE — 99214 OFFICE O/P EST MOD 30 MIN: CPT | Mod: 25 | Performed by: NURSE PRACTITIONER

## 2023-10-20 PROCEDURE — 83036 HEMOGLOBIN GLYCOSYLATED A1C: CPT | Performed by: NURSE PRACTITIONER

## 2023-10-20 PROCEDURE — 90472 IMMUNIZATION ADMIN EACH ADD: CPT | Performed by: NURSE PRACTITIONER

## 2023-10-20 RX ORDER — POLYETHYLENE GLYCOL 3350 17 G/17G
1 POWDER, FOR SOLUTION ORAL DAILY
Qty: 578 G | Refills: 11 | Status: SHIPPED | OUTPATIENT
Start: 2023-10-20

## 2023-10-20 ASSESSMENT — PAIN SCALES - GENERAL: PAINLEVEL: NO PAIN (0)

## 2023-10-20 NOTE — PROGRESS NOTES
SUBJECTIVE:   CC: Judy is an 33 year old who presents for preventive health visit.       10/20/2023    10:46 AM   Additional Questions   Roomed by Citlali   Accompanied by daughter and son       Information given via .  She reports history of thyroid disorder.  Recently having issues with mood-feels uneasy and fatigue.  Discussed checking labs today.  If labs are abnormal we will adjust medication.  If symptoms persist she should return for further evaluation.  She reports ongoing right rib upper abdominal pain for years that comes and goes.  Every time she goes to the doctor it is not there.  She reports ongoing issues with constipation she has a bowel movement approximately every 3 days.  She is not taking medication for this.  It seems the right upper abdominal pain and rib pain is worse when she is constipated.  She has no family history of colon or bowel issues.  When the pain is bad she cannot move and has to lay in the fetal position.  She still has her gallbladder.  Pain comes and goes last time it lasted 3 hours, she would go for approximately 3-minute periods with no pain and it would come back.  She denies night sweats or unexplained weight loss.  We discussed ultrasound to look at her gallbladder.    Her periods are irregular every other month.  She also notes left lower abdominal pain.  We discussed ultrasound to look at her ovary.  If labs and imaging are normal may need colonoscopy.  We will treat constipation for now with MiraLAX.  Tips given on after visit summary.    She reports issues with blurred vision she does not wear glasses.  Will refer for eye exam.  She reports left molar fracture she is currently seeing a dentist for this.  She reports she is currently breast-feeding her 1-1/2-year-old child.  All 3 children would not breast-feed from right breast.  This is concerning for her.  Discussed mammogram.  She denies family history of breast cancer.  She reports mole to right  upper leg first noticed when she had her .  It is asymptomatic.  Discussed monitoring for change, and if changes she needs to return for biopsy.  She is in agreement.    She is in agreement to screening labs, hepatitis B tetanus, flu, COVID-vaccine today.  History of Present Illness       Reason for visit:  Annual exam and Thyroid exam    She eats 4 or more servings of fruits and vegetables daily.She consumes 0 sweetened beverage(s) daily.She exercises with enough effort to increase her heart rate 9 or less minutes per day.  She exercises with enough effort to increase her heart rate 3 or less days per week.   She is not taking prescribed medications regularly due to None.      Patient would still like to discuss the followin.) thyroid   2.) right sided pain beneath ribs              Social History     Tobacco Use    Smoking status: Never    Smokeless tobacco: Never   Substance Use Topics    Alcohol use: Yes     Comment: Occassionally             10/11/2022    10:19 AM   Alcohol Use   Prescreen: >3 drinks/day or >7 drinks/week? No     Reviewed orders with patient.  Reviewed health maintenance and updated orders accordingly - Yes  Lab work is in process  Labs reviewed in EPIC  BP Readings from Last 3 Encounters:   10/20/23 112/80   23 104/75   23 110/78    Wt Readings from Last 3 Encounters:   10/20/23 76.9 kg (169 lb 9.6 oz)   23 77.7 kg (171 lb 6.4 oz)   23 76.7 kg (169 lb)                  Patient Active Problem List   Diagnosis    Lactation disorder    RUQ abdominal pain    Rib pain on right side    Acquired hypothyroidism    LLQ abdominal pain    Irregular menses    Blurred vision    Constipation, unspecified constipation type    Non-English speaking patient    Mood change    Fatigue, unspecified type    Open fracture of tooth with nonunion, subsequent encounter     History reviewed. No pertinent surgical history.    Social History     Tobacco Use    Smoking status: Never     Smokeless tobacco: Never   Substance Use Topics    Alcohol use: Yes     Comment: Occassionally     Family History   Problem Relation Age of Onset    Diabetes Father     Eye Surgery Other     Diabetes Other          Current Outpatient Medications   Medication Sig Dispense Refill    Brimonidine Tartrate 0.025 % SOLN Place 1 drop into both eyes every 8 hours as needed (for redness) 7.5 mL 4    levothyroxine (SYNTHROID/LEVOTHROID) 25 MCG tablet Take 1 tablet (25 mcg) by mouth daily 90 tablet 3    polyethylene glycol (MIRALAX) 17 GM/Dose powder Take 17 g (1 Capful) by mouth daily Drink 8-12 glasses of water daily. 578 g 11    Tetrahydrozoline HCl (VISINE RED EYE COMFORT OP) Apply 1 drop to eye 2 times daily       No Known Allergies  Recent Labs   Lab Test 23  0906 10/11/22  1107   A1C 5.5  --    TSH 2.24 1.90        Breast Cancer Screening:        10/11/2022    10:20 AM   Breast CA Risk Assessment (FHS-7)   Do you have a family history of breast, colon, or ovarian cancer? No / Unknown       Patient under 40 years of age: Routine Mammogram Screening not recommended.   Pertinent mammograms are reviewed under the imaging tab.    History of abnormal Pap smear: NO - age 30-65 PAP every 5 years with negative HPV co-testing recommended     Reviewed and updated as needed this visit by clinical staff   Tobacco  Allergies  Meds  Problems  Med Hx  Surg Hx  Fam Hx  Soc   Hx        Reviewed and updated as needed this visit by Provider   Tobacco  Allergies  Meds  Problems  Med Hx  Surg Hx  Fam Hx  Soc   Hx       History reviewed. No pertinent past medical history.   History reviewed. No pertinent surgical history.  OB History    Para Term  AB Living   3 3 2 1 0 3   SAB IAB Ectopic Multiple Live Births   0 0 0 0 3      # Outcome Date GA Lbr Joseph/2nd Weight Sex Delivery Anes PTL Lv   3  22    M CS-Unspec   PETE   2 Term 14    M    PETE   1 Term 10/11/07    F    PETE  "      Review of Systems  CONSTITUTIONAL: NEGATIVE for fever, chills, change in weight  INTEGUMENTARU/SKIN: POSITIVE mole to right groin  EYES: Blurred visionPOSITIVE   ENT: NEGATIVE for ear, mouth and throat problems POSITIVE dental fractures seeing dentist currently  RESP: NEGATIVE for significant cough or SOB  BREAST: POSITIVE for breast symptoms-she reports all 3 kids would not breast-feed on the right side  CV: NEGATIVE for chest pain, palpitations or peripheral edema  GI: NEGATIVE for nausea, heartburn POSITIVE for right upper abdominal pain, right lower abdominal pain abnormal.'s every other month  : NEGATIVE for unusual urinary or vaginal symptoms. Periods are regular.  MUSCULOSKELETAL: NEGATIVE for myalgia POSITIVE for intermittent right lower rib pain  NEURO: NEGATIVE for weakness, dizziness or paresthesias  ENDOCRINE: NEGATIVE for temperature intolerance, skin/hair changes  HEME/ALLERGY/IMMUNE: NEGATIVE for bleeding problems  PSYCHIATRIC: POSITIVE for changes in mood and energy     OBJECTIVE:   /80   Pulse 86   Temp 97.2  F (36.2  C) (Temporal)   Resp 20   Ht 1.572 m (5' 1.89\")   Wt 76.9 kg (169 lb 9.6 oz)   LMP 09/29/2023 (Exact Date)   SpO2 100%   BMI 31.13 kg/m    Physical Exam  GENERAL: healthy, alert and no distress  EYES: Eyes grossly normal to inspection, PERRL and conjunctivae and sclerae normal  HENT: ear canals and TM's normal, nose and mouth without ulcers or lesions  NECK: no adenopathy, no asymmetry, masses, or scars and thyroid normal to palpation  RESP: lungs clear to auscultation - no rales, rhonchi or wheezes  BREAST: Deferred per guidelines, mammogram ordered  CV: regular rate and rhythm, normal S1 S2, no S3 or S4, no murmur, click or rub, no peripheral edema and peripheral pulses strong  ABDOMEN: soft, no hepatosplenomegaly, no masses and bowel sounds normal POSITIVE tenderness with palpation of left lower quadrant, rebound tenderness left lower quadrant   (female): " Deferred per patient no concerns  MS: no gross musculoskeletal defects noted, no edema, no CVA tenderness, no pain with palpation of bilateral ribs  SKIN: POSITIVE normal-appearing mole to right groin advised to take a picture every 3 months and monitor for change in size, color, shape, symptoms-itching bleeding crusting  NEURO: Normal strength and tone, cranial nerves intact, mentation intact and speech normal  PSYCH: mentation appears normal, affect normal/bright  LYMPH: no cervical, supraclavicular adenopathy    Diagnostic Test Results:  Labs reviewed in Epic  See orders    ASSESSMENT/PLAN:       ICD-10-CM    1. Routine general medical examination at a health care facility  Z00.00       2. RUQ abdominal pain  R10.11 Comprehensive metabolic panel (BMP + Alb, Alk Phos, ALT, AST, Total. Bili, TP)     US Abdomen Limited     polyethylene glycol (MIRALAX) 17 GM/Dose powder     Comprehensive metabolic panel (BMP + Alb, Alk Phos, ALT, AST, Total. Bili, TP)      3. Rib pain on right side  R07.81 Comprehensive metabolic panel (BMP + Alb, Alk Phos, ALT, AST, Total. Bili, TP)     Comprehensive metabolic panel (BMP + Alb, Alk Phos, ALT, AST, Total. Bili, TP)      4. Acquired hypothyroidism  E03.9 TSH with free T4 reflex     TSH with free T4 reflex      5. Screening cholesterol level  Z13.220 Lipid panel reflex to direct LDL Fasting     Lipid panel reflex to direct LDL Fasting      6. Screening for diabetes mellitus  Z13.1 Hemoglobin A1c     Hemoglobin A1c      7. Encounter for screening mammogram for breast cancer  Z12.31 *MA Screening Digital Bilateral      8. LLQ abdominal pain  R10.32 US Pelvic Complete with Transvaginal     polyethylene glycol (MIRALAX) 17 GM/Dose powder      9. Irregular menses  N92.6 US Pelvic Complete with Transvaginal      10. Blurred vision  H53.8 Adult Eye  Referral      11. Constipation, unspecified constipation type  K59.00 polyethylene glycol (MIRALAX) 17 GM/Dose powder      12.  "Non-English speaking patient  Z78.9       13. Mood change  R45.86 TSH with free T4 reflex      14. Fatigue, unspecified type  R53.83 TSH with free T4 reflex      15. Open fracture of tooth with nonunion, subsequent encounter  S02.5XXK           Patient has been advised of split billing requirements and indicates understanding: Yes      COUNSELING:  Reviewed preventive health counseling, as reflected in patient instructions      BMI:   Estimated body mass index is 31.13 kg/m  as calculated from the following:    Height as of this encounter: 1.572 m (5' 1.89\").    Weight as of this encounter: 76.9 kg (169 lb 9.6 oz).   Weight management plan: Discussed healthy diet and exercise guidelines      She reports that she has never smoked. She has never used smokeless tobacco.          SAMUEL PERALTA  [facility]LL  "

## 2023-10-20 NOTE — PROGRESS NOTES
"  {PROVIDER CHARTING PREFERENCE:569120}    Valerie Kim is a 33 year old, presenting for the following health issues:  Thyroid Problem      10/20/2023    10:46 AM   Additional Questions   Roomed by Citlali   Accompanied by daughter and son       History of Present Illness       Reason for visit:  Annual exam and Thyroid exam    She eats 4 or more servings of fruits and vegetables daily.She consumes 0 sweetened beverage(s) daily.She exercises with enough effort to increase her heart rate 9 or less minutes per day.  She exercises with enough effort to increase her heart rate 3 or less days per week.   She is taking medications regularly.       {MA/LPN/RN Pre-Provider Visit Orders- hCG/UA/Strep (Optional):897131}  {SUPERLIST (Optional):129085}  {additonal problems for provider to add (Optional):048452}      Review of Systems   {ROS COMP (Optional):910440}      Objective    Pulse 86   Temp 97.2  F (36.2  C) (Temporal)   Resp 20   Ht 1.572 m (5' 1.89\")   Wt 76.9 kg (169 lb 9.6 oz)   LMP 09/29/2023 (Exact Date)   SpO2 100%   BMI 31.13 kg/m    Body mass index is 31.13 kg/m .  Physical Exam   {Exam List (Optional):125339}    {Diagnostic Test Results (Optional):894169}    {AMBULATORY ATTESTATION (Optional):366428}            "

## 2023-10-20 NOTE — NURSING NOTE
Prior to immunization administration, verified patients identity using patient s name and date of birth. Please see Immunization Activity for additional information.     Screening Questionnaire for Adult Immunization    Are you sick today?   No   Do you have allergies to medications, food, a vaccine component or latex?   No   Have you ever had a serious reaction after receiving a vaccination?   No   Do you have a long-term health problem with heart, lung, kidney, or metabolic disease (e.g., diabetes), asthma, a blood disorder, no spleen, complement component deficiency, a cochlear implant, or a spinal fluid leak?  Are you on long-term aspirin therapy?   No   Do you have cancer, leukemia, HIV/AIDS, or any other immune system problem?   No   Do you have a parent, brother, or sister with an immune system problem?   No   In the past 3 months, have you taken medications that affect  your immune system, such as prednisone, other steroids, or anticancer drugs; drugs for the treatment of rheumatoid arthritis, Crohn s disease, or psoriasis; or have you had radiation treatments?   No   Have you had a seizure, or a brain or other nervous system problem?   No   During the past year, have you received a transfusion of blood or blood    products, or been given immune (gamma) globulin or antiviral drug?   No   For women: Are you pregnant or is there a chance you could become       pregnant during the next month?   No   Have you received any vaccinations in the past 4 weeks?   No     Immunization questionnaire answers were all negative.      Patient instructed to remain in clinic for 15 minutes afterwards, and to report any adverse reactions.     Screening performed by Citlali Dow MA on 10/20/2023 at 11:52 AM.

## 2023-10-25 NOTE — RESULT ENCOUNTER NOTE
Jarvis Kim,    Thank you for your recent office visit.    Here are your recent results.  Your A1c is in the prediabetes range.  You can improve this with regular exercise, diet changes, weight loss.  Cholesterol is considered normal for a nondiabetic.  If you become diabetic your cholesterol is too high and we would need to put you on cholesterol-lowering medication.  Your thyroid level is normal.  Your kidney and liver function is normal.    Feel free to contact me via [employer] or call the clinic at [phone].    Sincerely,    MICHELLE Villavicencio, FNP-BC

## 2024-02-16 DIAGNOSIS — E03.8 OTHER SPECIFIED HYPOTHYROIDISM: ICD-10-CM

## 2024-02-16 RX ORDER — LEVOTHYROXINE SODIUM 25 UG/1
25 TABLET ORAL DAILY
Qty: 90 TABLET | Refills: 3 | Status: SHIPPED | OUTPATIENT
Start: 2024-02-16

## 2024-07-29 NOTE — PATIENT INSTRUCTIONS
If you have labs or imaging done, the results will automatically release in [employer] without an interpretation.  Your health care professional will review those results and send an interpretation with recommendations as soon as possible, but this may be 1-3 business days.    If you have any questions regarding your visit, please contact your care team.     [facility]: [phone]  [facility] CLINIC HOURS TELEPHONE NUMBER   Joo HUITRON Georgi .      Zeina-NATHAN Soria-NATHAN Rodriguez-Surgery Scheduler  Thais-         Monday-[place]  8:00 am-4:00 pm  [facility]00 [facility]  Wednesday-[place] 8:00 am-4:00 pm  Thursday-[place] 8:00 am-4:00 pm    Typical Surgery Day Friday [facility]  [address] [place] [address]  PH: [phone] Fax    Imaging Scheduling all locations  PH: [phone]     [facility]  [address]  [place], [place] [address] [phone]    [facility]  [address] [place] [address]  PH: [phone]     **Surgeries** Our Surgery Schedulers will contact you to schedule. If you do not receive a call within 3 business days, please call [phone].  Urgent Care locations:  [facility]       Monday-Friday   10 am - 8 pm  Saturday and Sunday   9 am - 5 pm   [phone]   If you need a medication refill, please contact your pharmacy. Please allow 3 business days for your refill to be completed.  As always, Thank you for trusting us with your healthcare needs!

## 2024-07-31 ENCOUNTER — PREP FOR PROCEDURE (OUTPATIENT)
Dept: OBGYN | Facility: CLINIC | Age: 34
End: 2024-07-31

## 2024-07-31 ENCOUNTER — TELEPHONE (OUTPATIENT)
Dept: OBGYN | Facility: CLINIC | Age: 34
End: 2024-07-31

## 2024-07-31 ENCOUNTER — OFFICE VISIT (OUTPATIENT)
Dept: OBGYN | Facility: CLINIC | Age: 34
End: 2024-07-31
Attending: GENERAL PRACTICE
Payer: COMMERCIAL

## 2024-07-31 VITALS
HEART RATE: 80 BPM | BODY MASS INDEX: 29.15 KG/M2 | DIASTOLIC BLOOD PRESSURE: 80 MMHG | SYSTOLIC BLOOD PRESSURE: 113 MMHG | WEIGHT: 158.8 LBS | OXYGEN SATURATION: 98 %

## 2024-07-31 DIAGNOSIS — Z01.419 ENCOUNTER FOR GYNECOLOGICAL EXAMINATION WITHOUT ABNORMAL FINDING: Primary | ICD-10-CM

## 2024-07-31 DIAGNOSIS — N39.3 SUI (STRESS URINARY INCONTINENCE, FEMALE): ICD-10-CM

## 2024-07-31 DIAGNOSIS — Z30.2 ENCOUNTER FOR STERILIZATION: Primary | ICD-10-CM

## 2024-07-31 DIAGNOSIS — Z30.09 STERILIZATION CONSULT: ICD-10-CM

## 2024-07-31 PROCEDURE — 99395 PREV VISIT EST AGE 18-39: CPT | Performed by: GENERAL PRACTICE

## 2024-07-31 RX ORDER — COPPER 313.4 MG/1
1 INTRAUTERINE DEVICE INTRAUTERINE ONCE
COMMUNITY

## 2024-07-31 NOTE — PROGRESS NOTES
** used for entire clinic visit**    Judy is a 34 year old  here for annual exam. On ROS she notes some SUDHEER with coughing/sneezing/jumping. Also notes very irregular periods. She is interested in tubal ligation. No other issues or concerns.     She is currently using Paragard for birth control.     ROS:   Weight loss or gain: denies  Abdominal bloating / pain: denies  Appetite: normal  Energy: normal  Nausea / vomiting: denies  Fevers / chills / night sweats: denies  SOB / cough: denies  Chest pain / palpitations: denies  Diarrhea / constipation: denies  Bloody / tar-colored stools: denies  Urinary frequency / urgency / blood: denies  Headaches / vision changes: denies  Mouth sores: denies  Skin changes / rashes: denies      GYNHx:   Patient's last menstrual period was 07/15/2024.  Cycles: irregular q1-2 months, sometimes very long  Menorrhagia: denies  Dysmenorrhea: minimal  Last paps:  : NILM/HPV-  Prior abnormal pap: denies    OBHx:     OB History    Para Term  AB Living   3 3 2 1 0 3   SAB IAB Ectopic Multiple Live Births   0 0 0 0 3      # Outcome Date GA Lbr Joseph/2nd Weight Sex Type Anes PTL Lv   3  22    M CS-Unspec   PETE   2 Term 14    M    PETE   1 Term 10/11/07    F    PETE       PSH:   History reviewed. No pertinent surgical history.      PMH:  History reviewed. No pertinent past medical history.   Hypothyroid      MEDs  Current Outpatient Medications   Medication Sig Dispense Refill    levothyroxine (SYNTHROID/LEVOTHROID) 25 MCG tablet Take 1 tablet (25 mcg) by mouth daily 90 tablet 3    paragard intrauterine copper device 1 each by Intrauterine route once      Brimonidine Tartrate 0.025 % SOLN Place 1 drop into both eyes every 8 hours as needed (for redness) (Patient not taking: Reported on 2024) 7.5 mL 4    polyethylene glycol (MIRALAX) 17 GM/Dose powder Take 17 g (1 Capful) by mouth daily Drink 8-12 glasses of water  daily. (Patient not taking: Reported on 7/31/2024) 578 g 11    Tetrahydrozoline HCl (VISINE RED EYE COMFORT OP) Apply 1 drop to eye 2 times daily (Patient not taking: Reported on 7/31/2024)       No current facility-administered medications for this visit.       Allergies:  No Known Allergies    FamHx:  Family History   Problem Relation Age of Onset    Diabetes Father     Eye Surgery Other     Diabetes Other    Denies history of breast, uterine, ovarian, colon cancers    SocHx:  Social History     Socioeconomic History    Marital status:      Spouse name: Not on file    Number of children: Not on file    Years of education: Not on file    Highest education level: Not on file   Occupational History    Not on file   Tobacco Use    Smoking status: Never    Smokeless tobacco: Never   Vaping Use    Vaping status: Never Used   Substance and Sexual Activity    Alcohol use: Yes     Comment: Occassionally    Drug use: Never    Sexual activity: Yes     Partners: Male     Birth control/protection: I.U.D.   Other Topics Concern    Not on file   Social History Narrative    Not on file     Social Determinants of Health     Financial Resource Strain: Low Risk  (10/17/2023)    Financial Resource Strain     Within the past 12 months, have you or your family members you live with been unable to get utilities (heat, electricity) when it was really needed?: No   Food Insecurity: Low Risk  (10/17/2023)    Food Insecurity     Within the past 12 months, did you worry that your food would run out before you got money to buy more?: No     Within the past 12 months, did the food you bought just not last and you didn t have money to get more?: No   Transportation Needs: Low Risk  (10/17/2023)    Transportation Needs     Within the past 12 months, has lack of transportation kept you from medical appointments, getting your medicines, non-medical meetings or appointments, work, or from getting things that you need?: No   Physical  Activity: Not on file   Stress: Not on file   Social Connections: Not on file   Interpersonal Safety: Not on file   Housing Stability: Low Risk  (10/17/2023)    Housing Stability     Do you have housing? : Yes     Are you worried about losing your housing?: No         PE: /80   Pulse 80   Wt 72 kg (158 lb 12.8 oz)   LMP 07/15/2024   SpO2 98%   BMI 29.15 kg/m    Body mass index is 29.15 kg/m .    General Appearance:  healthy, alert, active, no distress  Cardiovascular:  well perfused  Neck: Supple, no adenopathy, and thyroid normal  Lungs:  non labored breathing  Breast: Normal breast exam bilaterally with no palpable masses, or visible retractions, dimpling, erythema, or nipple discharge.  Abdomen: Benign, No masses, organomegaly, No inguinal nodes, and Soft, non-tender.   Pelvic:       - Ext: Vulva and perineum are normal without lesion, mass or discharge        - Urethra: normal without discharge or scarring        - Bladder: No tenderness       - Vagina: Normal vaginal mucosa with no masses or lesions, physiologic discharge noted, no blood in the vault.       - Cervix: Normal in appearance.  IUD strings visible from cervical os.        - Uterus:Normal shape, position and consistency    A/P: Well Woman, occasional stress urinary continence, and desiring of permanent sterilization.   Diagnosis Comments   1. Encounter for gynecological examination without abnormal finding        2. Sterilization consult  Patient desiring permanent sterilization.  Reviewed the risks and benefits of laparoscopic bilateral salpingectomy.  Patient states she does not desire any future children understands that this procedure is permanent.  Reviewed risks of surgery to include bleeding, infection, need for additional procedures, injury to other organs or tissues, DVT, or anesthesia related risks.  Will forward patient information to surgical coordinator for scheduling of laparoscopic bilateral salpingectomy.      3. SUDHEER  (stress urinary incontinence, female)  Symptoms are occasional.  Reviewed management options to include frequent voiding to keep her bladder empty, can Kegels at home, consult for physical therapy and sling procedure.  Patient declines consult and does not desire surgery for this at this time.           - Pap was not performed. Next cervical screening due 2027     - Encouraged healthy diet, regular exercise, self-breast examination.      Joo Laureano DO, FACOG

## 2024-07-31 NOTE — TELEPHONE ENCOUNTER
Associated Diagnoses    Encounter for sterilization [Z30.2]  - Primary        Source Order Set    Order Set Name Order ID    [id number]     Case Request: Case Info    Panel 1    Providers    Provider Role Service   Joo Laureano DO Primary Gynecology     Procedures    Procedure Laterality Anesthesia Region   BILATERAL LAPAROSCOPIC SALPINGECTOMY Bilateral General Abdomen                  Requested date:   Location:  OR   Patient class:      Pre-op diagnoses: Encounter for sterilization     Scheduling Instructions    Surgical Assistant: Surgical Assist: Not needed  Multi Surgeon Case No  CSC okay No  H&P:  Pre-op options: PCP  Post-op:  2 weeks  Sterilization consent:  Not yet, PLEASE mail to patient.  Vendor: No  Surgical time needed: 60 Minutes  ERAS patient: No  If scheduling for D&E does Laminaria placement: No

## 2024-09-26 NOTE — TELEPHONE ENCOUNTER
Patient has not returned calls or my chart message to schedule surgery.  Ok to close encounter or should we continue to reach out to patient?

## 2025-07-21 ENCOUNTER — PATIENT OUTREACH (OUTPATIENT)
Dept: CARE COORDINATION | Facility: CLINIC | Age: 35
End: 2025-07-21
Payer: COMMERCIAL